# Patient Record
Sex: MALE | Race: OTHER | Employment: FULL TIME | URBAN - METROPOLITAN AREA
[De-identification: names, ages, dates, MRNs, and addresses within clinical notes are randomized per-mention and may not be internally consistent; named-entity substitution may affect disease eponyms.]

---

## 2021-09-08 ENCOUNTER — HOSPITAL ENCOUNTER (EMERGENCY)
Facility: HOSPITAL | Age: 26
Discharge: HOME | End: 2021-09-08
Attending: EMERGENCY MEDICINE

## 2021-09-08 VITALS
SYSTOLIC BLOOD PRESSURE: 128 MMHG | HEART RATE: 74 BPM | RESPIRATION RATE: 16 BRPM | OXYGEN SATURATION: 98 % | TEMPERATURE: 98 F | HEIGHT: 68 IN | WEIGHT: 132 LBS | BODY MASS INDEX: 20 KG/M2 | DIASTOLIC BLOOD PRESSURE: 79 MMHG

## 2021-09-08 DIAGNOSIS — E11.9 DIABETES MELLITUS, NEW ONSET (HCC): Primary | ICD-10-CM

## 2021-09-08 LAB — GLUCOSE BLD-MCNC: 292 MG/DL (ref 70–99)

## 2021-09-08 PROCEDURE — 99283 EMERGENCY DEPT VISIT LOW MDM: CPT

## 2021-09-08 PROCEDURE — 82962 GLUCOSE BLOOD TEST: CPT

## 2021-09-08 NOTE — ED INITIAL ASSESSMENT (HPI)
To the ED with elevated glucose levels, glucose of 326 amd AIC 16. 3. states that had a normal check up with labs. Denies any urinary frequency or vomiting. + thirst a month ago but resolved.

## 2021-09-09 NOTE — CM/SW NOTE
Called by ARPIT Villatoro RN - patient is Dive[place] speaking only patient with no insurance that was just diagnosed with new Type 2 DM. ARPIT Villatoro RN requesting REAGAN's assistance with getting patient set up with outpatient follow up.      REAGAN spoke with patient via spect

## 2021-09-09 NOTE — CM/SW NOTE
20 pages of pertinent medical records including face sheeet, 2 ERCM notes and ER encounter summary faxed to Guillermo Ramirez @ [phone] & [phone] - confirmations rec'd.  Also left detailed message for [address] [place] [address] Dr An @ [phone]

## 2021-09-09 NOTE — ED PROVIDER NOTES
Patient Seen in: [facility] Emergency Department      History   Patient presents with:  Hyperglycemia    Stated Complaint: elevated blood sugar, not diabetic    HPI/Subjective:   HPI    33-year-old male presents reporting he had blood work done [facility] moist   Neck: supple, no rigidity   Lungs: good air exchange and clear   Heart: regular rate rhythm and no murmur   Abdomen: Soft and nontender. No abdominal masses.   No peritoneal signs   Extremities: no edema, normal peripheral pulses   Neuro: Alert henok

## 2021-09-09 NOTE — CM/SW NOTE
REAGAN spoke with Kaleigh Marrufo in [facility] and informed her to please scan the 7 page fax into pt's chart - Kaleigh Marrufo agreeable she requests SUSAN Villatoro ER RN bring the fax to her in [address] so she can scan it into pt's chart.  REAGAN notified ARPIT Villatoro RN of this - [place] v/u and